# Patient Record
Sex: MALE | Race: WHITE | NOT HISPANIC OR LATINO
[De-identification: names, ages, dates, MRNs, and addresses within clinical notes are randomized per-mention and may not be internally consistent; named-entity substitution may affect disease eponyms.]

---

## 2018-03-05 ENCOUNTER — TRANSCRIPTION ENCOUNTER (OUTPATIENT)
Age: 23
End: 2018-03-05

## 2018-11-19 ENCOUNTER — EMERGENCY (EMERGENCY)
Facility: HOSPITAL | Age: 23
LOS: 1 days | Discharge: HOME | End: 2018-11-19
Attending: EMERGENCY MEDICINE | Admitting: EMERGENCY MEDICINE
Payer: COMMERCIAL

## 2018-11-19 VITALS
WEIGHT: 179.9 LBS | SYSTOLIC BLOOD PRESSURE: 114 MMHG | HEIGHT: 75 IN | RESPIRATION RATE: 16 BRPM | OXYGEN SATURATION: 100 % | TEMPERATURE: 98 F | HEART RATE: 67 BPM | DIASTOLIC BLOOD PRESSURE: 65 MMHG

## 2018-11-19 VITALS
RESPIRATION RATE: 16 BRPM | OXYGEN SATURATION: 98 % | DIASTOLIC BLOOD PRESSURE: 65 MMHG | HEART RATE: 67 BPM | SYSTOLIC BLOOD PRESSURE: 112 MMHG | TEMPERATURE: 99 F

## 2018-11-19 PROCEDURE — 99283 EMERGENCY DEPT VISIT LOW MDM: CPT | Mod: 25

## 2018-11-19 PROCEDURE — 99284 EMERGENCY DEPT VISIT MOD MDM: CPT

## 2018-11-19 PROCEDURE — 73552 X-RAY EXAM OF FEMUR 2/>: CPT | Mod: 26,LT

## 2018-11-19 PROCEDURE — 73552 X-RAY EXAM OF FEMUR 2/>: CPT

## 2018-11-19 RX ORDER — IBUPROFEN 200 MG
600 TABLET ORAL ONCE
Qty: 0 | Refills: 0 | Status: COMPLETED | OUTPATIENT
Start: 2018-11-19 | End: 2018-11-19

## 2018-11-19 RX ADMIN — Medication 600 MILLIGRAM(S): at 12:57

## 2018-11-19 NOTE — ED PROVIDER NOTE - MEDICAL DECISION MAKING DETAILS
xray femur, pain management, reeval  Please follow up with your Primary MD in 24-48 hr. Please follow up with orthopedicsw Dr Tyson within 3 days- call today for an appointment. Rest, ice, elevate extremity. OTC motrin every 6 hours as needed for pain, take with food. Return to ED immediately if condition worsens or any concerns.  Seek immediate medical care for any new/worsening signs or symptoms. xray femur, pain management, reeval  Please follow up with your Primary MD in 24-48 hr. Please follow up with orthopedics Dr Tyson within 3 days- call today for an appointment. Rest, ice, elevate extremity. OTC motrin every 6 hours as needed for pain, take with food. Return to ED immediately if condition worsens or any concerns.  Seek immediate medical care for any new/worsening signs or symptoms.

## 2018-11-19 NOTE — ED PROVIDER NOTE - OBJECTIVE STATEMENT
24 y/o male presents to ED c/o left hamstring pain s/p injury while playing football x 4 days ago. States he turned quickly while running and then heard a pop in his left hamstring. Rates pain 5/10, worse with movement, no radiation of pain, sudden onset. Denies fall. Denies any other complaints. States he otherwise feels good. Denies n/v, f/c, chest pain, sob, numbness, tingling.

## 2018-11-19 NOTE — ED ADULT NURSE NOTE - OBJECTIVE STATEMENT
23 yr. old male c/o left posterior thigh pain after playing football 4 days ago.  Pain increases with movement.

## 2018-11-19 NOTE — ED PROVIDER NOTE - PHYSICAL EXAMINATION
Head- NC/AT. No andino signs, no raccoon eyes, no hemotympanum, no contusions, no hematoma, no dental injuries.  Spine- no midline or paraspinal tenderness of cspine, thoracic spine or lumbar spine. No signs of back trauma, no masses, no abrasions, no lacerations, no redness, no bruising.  Neck- supple, no midline tenderness to palpation, + FROM, NEXUS negative, no abrasions, no ecchymosis.  Neuro- EOM intact, no nystagmus. Pelvis stable. Pt able to straight leg raise BL. NVI, good distal pulses x 4 extremities, capillary refill <2 sec x 4 extremities, sensation intact throughout, 5/5 motor x 4 extremities. CDTRs normal x 4 extremities.  Chest- no chest wall tenderness, equal expansion BL. No clavicular tenderness BL. No bruising, no deformity, no redness, no abrasions.  Extremities- No bony tenderness of upper or lower extremities BL except where noted. FROM shoulders, elbows, wrists, hips, knees, ankles. NVI, good distal pulses x 4 extremities, capillary refill <2 sec x 4 extremities, sensation intact throughout, 5/5 motor x 4 extremities. No calf tenderness BL. No bruising, no swelling, no redness, no deformity, no local signs of infection.

## 2018-11-19 NOTE — ED PROVIDER NOTE - CHPI ED SYMPTOMS NEG
no difficulty bearing weight/no deformity/no numbness/no tingling/no weakness/no fever/no stiffness/no abrasion/no back pain/no bruising

## 2018-11-19 NOTE — ED PROVIDER NOTE - ATTENDING CONTRIBUTION TO CARE
I, Dr Dia, have personally performed a face to face diagnostic evaluation on this patient with the PA/NP. I have reviewed the PA/NP's note and agree with the history, Physical exam and plan of care, As per history 24 y/o male presents to ED c/o left hamstring pain s/p injury while playing football x 4 days ago. States he turned quickly while running and then heard a pop in his left hamstring. Rates pain 5/10, worse with movement, no radiation of pain, sudden onset. Denies fall. Denies any other complaints. States he otherwise feels good. Denies n/v, f/c, chest pain, sob, numbness, tingling. Physical unremarkable xray negative will discharge home to fallow up with PMD.

## 2018-11-19 NOTE — ED PROVIDER NOTE - PROGRESS NOTE DETAILS
pain improved after medication. pt ambulatory in ED> pain improved after medication. pt ambulatory in ED.

## 2018-11-20 ENCOUNTER — TRANSCRIPTION ENCOUNTER (OUTPATIENT)
Age: 23
End: 2018-11-20

## 2018-11-21 ENCOUNTER — EMERGENCY (EMERGENCY)
Facility: HOSPITAL | Age: 23
LOS: 1 days | Discharge: HOME | End: 2018-11-21
Attending: EMERGENCY MEDICINE | Admitting: EMERGENCY MEDICINE
Payer: COMMERCIAL

## 2018-11-21 VITALS
WEIGHT: 179.9 LBS | HEIGHT: 75 IN | RESPIRATION RATE: 16 BRPM | OXYGEN SATURATION: 99 % | HEART RATE: 108 BPM | SYSTOLIC BLOOD PRESSURE: 141 MMHG | DIASTOLIC BLOOD PRESSURE: 87 MMHG | TEMPERATURE: 98 F

## 2018-11-21 PROCEDURE — 99284 EMERGENCY DEPT VISIT MOD MDM: CPT

## 2018-11-21 PROCEDURE — 73140 X-RAY EXAM OF FINGER(S): CPT | Mod: 26,LT

## 2018-11-21 NOTE — ED ADULT NURSE NOTE - NSIMPLEMENTINTERV_GEN_ALL_ED
Implemented All Fall Risk Interventions:  [place] to call system. Call bell, personal items and telephone within reach. Instruct patient to call for assistance. Room bathroom lighting operational. Non-slip footwear when patient is off stretcher. Physically safe environment: no spills, clutter or unnecessary equipment. Stretcher in lowest position, wheels locked, appropriate side rails in place. Provide visual cue, wrist band, yellow gown, etc. Monitor gait and stability. Monitor for mental status changes and reorient to person, place, and time. Review medications for side effects contributing to fall risk. Reinforce activity limits and safety measures with patient and family.

## 2018-11-21 NOTE — ED PROVIDER NOTE - PROGRESS NOTE DETAILS
pt reevaluated, sutured by dr flowers, pt to follow up in the office to assess nerve damage, d/c with abx and return if any symptoms worsen pt got up and ambulated without dificulty, pt to be d/c home follow up with dr flowers in the office and return if any sytmpoms worsen
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
## 2018-11-22 VITALS
HEART RATE: 83 BPM | DIASTOLIC BLOOD PRESSURE: 70 MMHG | SYSTOLIC BLOOD PRESSURE: 130 MMHG | TEMPERATURE: 98 F | OXYGEN SATURATION: 98 % | RESPIRATION RATE: 15 BRPM

## 2018-11-22 PROCEDURE — 99285 EMERGENCY DEPT VISIT HI MDM: CPT | Mod: 25

## 2018-11-22 PROCEDURE — 73140 X-RAY EXAM OF FINGER(S): CPT

## 2018-11-22 PROCEDURE — 14040 TIS TRNFR F/C/C/M/N/A/G/H/F: CPT

## 2018-11-22 RX ADMIN — Medication 1 TABLET(S): at 05:27
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
## 2018-11-22 NOTE — CONSULT NOTE ADULT - ASSESSMENT
A/P: 24 y/o with laceration s/p repair.  - LUE elevation  - Pain control  - Tetanus  - Abx  - Maintain splint  - F/U 5 days  - Patient's sister educated on warning signs to prompt ER return  - Patient's sister educated that patient may require further intervention pending exam    Thank You  Chas Barakat MD  Plastic Surgery  [phone]

## 2021-03-22 ENCOUNTER — TRANSCRIPTION ENCOUNTER (OUTPATIENT)
Age: 26
End: 2021-03-22

## 2022-02-13 ENCOUNTER — TRANSCRIPTION ENCOUNTER (OUTPATIENT)
Age: 27
End: 2022-02-13